# Patient Record
Sex: FEMALE | Race: WHITE | URBAN - METROPOLITAN AREA
[De-identification: names, ages, dates, MRNs, and addresses within clinical notes are randomized per-mention and may not be internally consistent; named-entity substitution may affect disease eponyms.]

---

## 2020-11-19 ENCOUNTER — OFFICE VISIT (OUTPATIENT)
Dept: URBAN - METROPOLITAN AREA CLINIC 27 | Facility: CLINIC | Age: 80
End: 2020-11-19
Payer: MEDICARE

## 2020-11-19 PROCEDURE — 92012 INTRM OPH EXAM EST PATIENT: CPT | Performed by: OPHTHALMOLOGY

## 2020-11-19 PROCEDURE — 92134 CPTRZ OPH DX IMG PST SGM RTA: CPT | Performed by: OPHTHALMOLOGY

## 2020-11-19 PROCEDURE — 67028 INJECTION EYE DRUG: CPT | Performed by: OPHTHALMOLOGY

## 2020-11-19 ASSESSMENT — INTRAOCULAR PRESSURE
OD: 15
OS: 12

## 2020-11-19 NOTE — IMPRESSION/PLAN
Impression: Central retinal vein occls, right eye, with macular edema: H34.8110 OD.
s/p Ozurdex OD 8/11/2020 Resolved S/P PPV, MP, EL, IVK 5/12/14
s/p IVK 11/12/14
s/p Avastin, last 10/15/14 Plan: Exam and OCT testing show recurrent CME OD s/p Ozurdex 8/11/2020. The IOP is stable, and there is no evidence of NV. The risks versus benefits of periocular/intraocular steroids, anti-VEGF treatment, and observation were reviewed with the patient in detail. Recommend treatment OD at this time. Pt elects to proceed with Ozurdex, which was performed today without complications. Patient understands to call our office with any worsening vision. Extend treatment interval to 4 months. RTC 8 weeks, OCT OU, exam, IOP check RTC 16 weeks for OCT OU, re-eval for Ozurdex

## 2021-01-14 ENCOUNTER — OFFICE VISIT (OUTPATIENT)
Dept: URBAN - METROPOLITAN AREA CLINIC 27 | Facility: CLINIC | Age: 81
End: 2021-01-14
Payer: MEDICARE

## 2021-01-14 PROCEDURE — 99213 OFFICE O/P EST LOW 20 MIN: CPT | Performed by: OPHTHALMOLOGY

## 2021-01-14 PROCEDURE — 92134 CPTRZ OPH DX IMG PST SGM RTA: CPT | Performed by: OPHTHALMOLOGY

## 2021-01-14 ASSESSMENT — INTRAOCULAR PRESSURE
OD: 16
OS: 10

## 2021-01-14 NOTE — IMPRESSION/PLAN
Impression: Central retinal vein occls, right eye, with macular edema: H34.8110 OD.
s/p Ozurdex OD 8/11/2020 Resolved S/P PPV, MP, EL, IVK 5/12/14
s/p IVK 11/12/14
s/p Avastin, last 10/15/14 Plan: Exam and OCT testing show improving CME OD s/p Ozurdex 11/19/2020. The IOP is stable, and there is no evidence of NV. The risks versus benefits of periocular/intraocular steroids, anti-VEGF treatment, and observation were reviewed with the patient in detail. Recommend observation OD at this time. Patient understands to call our office with any worsening vision. Extend treatment interval to 4 months. 

RTC 8 weeks, OCT OU, exam, re-eval for Ozurdex

## 2021-03-17 ENCOUNTER — OFFICE VISIT (OUTPATIENT)
Dept: URBAN - METROPOLITAN AREA CLINIC 41 | Facility: CLINIC | Age: 81
End: 2021-03-17
Payer: MEDICARE

## 2021-03-17 DIAGNOSIS — Z96.1 PRESENCE OF INTRAOCULAR LENS: ICD-10-CM

## 2021-03-17 PROCEDURE — 92134 CPTRZ OPH DX IMG PST SGM RTA: CPT | Performed by: OPHTHALMOLOGY

## 2021-03-17 PROCEDURE — 99213 OFFICE O/P EST LOW 20 MIN: CPT | Performed by: OPHTHALMOLOGY

## 2021-03-17 PROCEDURE — 67028 INJECTION EYE DRUG: CPT | Performed by: OPHTHALMOLOGY

## 2021-03-17 ASSESSMENT — INTRAOCULAR PRESSURE
OS: 10
OD: 16

## 2021-03-17 NOTE — IMPRESSION/PLAN
Impression: Central retinal vein occls, right eye, with macular edema: H34.8110 OD.
s/p Ozurdex OD 8/11/2020 Resolved S/P PPV, MP, EL, IVK 5/12/14
s/p IVK 11/12/14
s/p Avastin, last 10/15/14 Plan: Exam and OCT testing show recurrent, juxtafoveal CME OD s/p Ozurdex 11/19/2020. The IOP is stable, and there is no evidence of NV. The risks versus benefits of periocular/intraocular steroids, anti-VEGF treatment, and observation were reviewed with the patient in detail. Recommend Ozurdex OD at this time. RBA discussed. Ozurdex OD was performed in the office per protocol without complication. Patient understands to call our office with any worsening vision. 

RTC 8 weeks, OCT OU, exam, re-eval for Ozurdex

## 2021-05-06 ENCOUNTER — OFFICE VISIT (OUTPATIENT)
Dept: URBAN - METROPOLITAN AREA CLINIC 27 | Facility: CLINIC | Age: 81
End: 2021-05-06
Payer: MEDICARE

## 2021-05-06 PROCEDURE — 99213 OFFICE O/P EST LOW 20 MIN: CPT | Performed by: OPHTHALMOLOGY

## 2021-05-06 PROCEDURE — 92134 CPTRZ OPH DX IMG PST SGM RTA: CPT | Performed by: OPHTHALMOLOGY

## 2021-05-06 ASSESSMENT — INTRAOCULAR PRESSURE
OD: 17
OS: 12

## 2021-05-06 NOTE — IMPRESSION/PLAN
Impression: Nonexudative age-related macular degeneration, bilateral, intermediate dry stage: H35.3132. Bilateral. Plan: Stable dry PED OU on exam/OCT. No SRH OU. Cont AREDS-2 and AG monitoring.

## 2021-05-06 NOTE — IMPRESSION/PLAN
Impression: Central retinal vein occls, right eye, with macular edema: H34.8110 OD.
s/p Ozurdex OD 3/17/21 Resolved S/P PPV, MP, EL, IVK 5/12/14
s/p IVK 11/12/14
s/p Avastin, last 10/15/14 Plan: Exam and OCT testing show improving juxtafoveal CME OD s/p Ozurdex 3/17/21. The IOP is stable, and there is no evidence of NV. The risks versus benefits of periocular/intraocular steroids, anti-VEGF treatment, and observation were reviewed with the patient in detail. Recommend observation. Patient understands to call our office with any worsening vision. 

RTC 8 weeks, OCT OU, exam, re-eval for Ozurdex

## 2021-07-15 ENCOUNTER — OFFICE VISIT (OUTPATIENT)
Dept: URBAN - METROPOLITAN AREA CLINIC 27 | Facility: CLINIC | Age: 81
End: 2021-07-15
Payer: MEDICARE

## 2021-07-15 DIAGNOSIS — H35.3132 NONEXUDATIVE AGE-RELATED MACULAR DEGENERATION, BILATERAL, INTERMEDIATE DRY STAGE: ICD-10-CM

## 2021-07-15 DIAGNOSIS — H43.813 VITREOUS DEGENERATION, BILATERAL: ICD-10-CM

## 2021-07-15 DIAGNOSIS — H34.8110 CENTRAL RETINAL VEIN OCCLS, RIGHT EYE, WITH MACULAR EDEMA: Primary | ICD-10-CM

## 2021-07-15 PROCEDURE — 99213 OFFICE O/P EST LOW 20 MIN: CPT | Performed by: OPHTHALMOLOGY

## 2021-07-15 PROCEDURE — 92134 CPTRZ OPH DX IMG PST SGM RTA: CPT | Performed by: OPHTHALMOLOGY

## 2021-07-15 ASSESSMENT — INTRAOCULAR PRESSURE
OD: 12
OS: 8

## 2021-10-14 ENCOUNTER — OFFICE VISIT (OUTPATIENT)
Dept: URBAN - METROPOLITAN AREA CLINIC 27 | Facility: CLINIC | Age: 81
End: 2021-10-14
Payer: MEDICARE

## 2021-10-14 PROCEDURE — 92134 CPTRZ OPH DX IMG PST SGM RTA: CPT | Performed by: OPHTHALMOLOGY

## 2021-10-14 PROCEDURE — 67028 INJECTION EYE DRUG: CPT | Performed by: OPHTHALMOLOGY

## 2021-10-14 PROCEDURE — 99213 OFFICE O/P EST LOW 20 MIN: CPT | Performed by: OPHTHALMOLOGY

## 2021-10-14 ASSESSMENT — INTRAOCULAR PRESSURE
OS: 8
OD: 12

## 2021-10-14 NOTE — IMPRESSION/PLAN
Impression: Central retinal vein occls, right eye, with macular edema: H34.8110 OD.
s/p Ozurdex OD 3/17/21 Resolved S/P PPV, MP, EL, IVK 5/12/14
s/p IVK 11/12/14
s/p Avastin, last 10/15/14 Plan: Exam and OCT testing show worsening juxtafoveal CME OD s/p Ozurdex 3/17/21. The IOP is stable, and there is no evidence of NV. The risks versus benefits of periocular/intraocular steroids, anti-VEGF treatment, and observation were reviewed with the patient in detail. Recommend Ozurdex today, pt elects to proceed. Patient understands to call our office with any worsening vision. 

RTC 3 months, OCT OU, exam, re-eval for Ozurdex

## 2022-01-13 ENCOUNTER — OFFICE VISIT (OUTPATIENT)
Dept: URBAN - METROPOLITAN AREA CLINIC 27 | Facility: CLINIC | Age: 82
End: 2022-01-13
Payer: MEDICARE

## 2022-01-13 PROCEDURE — 99213 OFFICE O/P EST LOW 20 MIN: CPT | Performed by: OPHTHALMOLOGY

## 2022-01-13 PROCEDURE — 92134 CPTRZ OPH DX IMG PST SGM RTA: CPT | Performed by: OPHTHALMOLOGY

## 2022-01-13 ASSESSMENT — INTRAOCULAR PRESSURE
OS: 13
OD: 18

## 2022-01-13 NOTE — IMPRESSION/PLAN
Impression: Central retinal vein occls, right eye, with macular edema: H34.8110 OD.
s/p Ozurdex OD 10/14/2021 Resolved S/P PPV, MP, EL, IVK 5/12/14
s/p IVK 11/12/14
s/p Avastin, last 10/15/14 Plan: Exam and OCT testing show stable, mildCME OD s/p Ozurdex 10/14/21. The IOP is borderline, but there is no evidence of NV. The risks versus benefits of periocular/intraocular steroids, anti-VEGF treatment, and observation were reviewed with the patient in detail. Recommend observation today. Patient understands to call our office with any worsening vision. 

RTC 6 weeks, OCT OU, exam, re-eval for Ozurdex

## 2022-01-13 NOTE — IMPRESSION/PLAN
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 Plan: Being worked up for OCP. Followed by Dr. Claude Taylor.   Scheduled for second opinion with Dr. Oren Kelly

## 2022-03-22 ENCOUNTER — OFFICE VISIT (OUTPATIENT)
Dept: URBAN - METROPOLITAN AREA CLINIC 27 | Facility: CLINIC | Age: 82
End: 2022-03-22
Payer: MEDICARE

## 2022-03-22 DIAGNOSIS — L12.8 OTHER PEMPHIGOID: ICD-10-CM

## 2022-03-22 PROCEDURE — 67028 INJECTION EYE DRUG: CPT | Performed by: OPHTHALMOLOGY

## 2022-03-22 PROCEDURE — 99213 OFFICE O/P EST LOW 20 MIN: CPT | Performed by: OPHTHALMOLOGY

## 2022-03-22 PROCEDURE — 92134 CPTRZ OPH DX IMG PST SGM RTA: CPT | Performed by: OPHTHALMOLOGY

## 2022-03-22 ASSESSMENT — INTRAOCULAR PRESSURE
OS: 8
OD: 10

## 2022-03-22 NOTE — IMPRESSION/PLAN
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 Plan: Being worked up for OCP.   S/P biopsy with Dr. Sharon Cleveland

## 2022-03-22 NOTE — IMPRESSION/PLAN
Impression: Central retinal vein occls, right eye, with macular edema: H34.8110 OD.
s/p Ozurdex OD 10/14/21 Resolved S/P PPV, MP, EL, IVK 5/12/14
s/p IVK 11/12/14
s/p Avastin, last 10/15/14 Plan: Exam and OCT testing show worsening juxtafoveal CME OD s/p Ozurdex 10/14/21. The IOP is stable, and there is no evidence of NV. The risks versus benefits of periocular/intraocular steroids, anti-VEGF treatment, and observation were reviewed with the patient in detail. Recommend Ozurdex today, pt elects to proceed. Patient understands to call our office with any worsening vision. 

RTC 3 months, OCT OU, exam, re-eval for Ozurdex

## 2022-06-28 ENCOUNTER — OFFICE VISIT (OUTPATIENT)
Dept: URBAN - METROPOLITAN AREA CLINIC 27 | Facility: CLINIC | Age: 82
End: 2022-06-28
Payer: MEDICARE

## 2022-06-28 DIAGNOSIS — H35.3132 NONEXUDATIVE AGE-RELATED MACULAR DEGENERATION, BILATERAL, INTERMEDIATE DRY STAGE: ICD-10-CM

## 2022-06-28 DIAGNOSIS — H43.813 VITREOUS DEGENERATION, BILATERAL: ICD-10-CM

## 2022-06-28 DIAGNOSIS — H34.8110 CENTRAL RETINAL VEIN OCCLUSION, RIGHT EYE, WITH MACULAR EDEMA: Primary | ICD-10-CM

## 2022-06-28 DIAGNOSIS — L12.8 OTHER PEMPHIGOID: ICD-10-CM

## 2022-06-28 PROCEDURE — 92134 CPTRZ OPH DX IMG PST SGM RTA: CPT | Performed by: OPHTHALMOLOGY

## 2022-06-28 PROCEDURE — 99213 OFFICE O/P EST LOW 20 MIN: CPT | Performed by: OPHTHALMOLOGY

## 2022-06-28 PROCEDURE — 67028 INJECTION EYE DRUG: CPT | Performed by: OPHTHALMOLOGY

## 2022-06-28 ASSESSMENT — INTRAOCULAR PRESSURE
OS: 11
OD: 14

## 2022-06-28 NOTE — IMPRESSION/PLAN
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 Plan: Being worked up for OCP.   S/P biopsy with Dr. Cody Frey

## 2022-06-28 NOTE — IMPRESSION/PLAN
Impression: Central retinal vein occls, right eye, with macular edema: H34.8110 OD.
s/p Ozurdex OD 03//22/22 Resolved S/P PPV, MP, EL, IVK 5/12/14
s/p IVK 11/12/14
s/p Avastin, last 10/15/14 Plan: Exam and OCT continue to demonstrate stable juxtafoveal CME OD s/p Ozurdex 03/22/22. The IOP is stable, and there is no evidence of NV. The risks versus benefits of periocular/intraocular steroids, anti-VEGF treatment, and observation were reviewed with the patient in detail. Recommend Ozurdex today, pt elects to proceed. Patient understands to call our office with any worsening vision. 

RTC 3 months, OCT OU, exam, re-eval for Ozurdex

## 2022-10-06 ENCOUNTER — OFFICE VISIT (OUTPATIENT)
Dept: URBAN - METROPOLITAN AREA CLINIC 27 | Facility: CLINIC | Age: 82
End: 2022-10-06
Payer: MEDICARE

## 2022-10-06 DIAGNOSIS — H35.3132 NONEXUDATIVE AGE-RELATED MACULAR DEGENERATION, BILATERAL, INTERMEDIATE DRY STAGE: ICD-10-CM

## 2022-10-06 DIAGNOSIS — H43.813 VITREOUS DEGENERATION, BILATERAL: ICD-10-CM

## 2022-10-06 DIAGNOSIS — L12.8 OTHER PEMPHIGOID: ICD-10-CM

## 2022-10-06 DIAGNOSIS — H34.8110 CENTRAL RETINAL VEIN OCCLUSION, RIGHT EYE, WITH MACULAR EDEMA: Primary | ICD-10-CM

## 2022-10-06 PROCEDURE — 99213 OFFICE O/P EST LOW 20 MIN: CPT | Performed by: OPHTHALMOLOGY

## 2022-10-06 PROCEDURE — 92134 CPTRZ OPH DX IMG PST SGM RTA: CPT | Performed by: OPHTHALMOLOGY

## 2022-10-06 ASSESSMENT — INTRAOCULAR PRESSURE
OD: 17
OS: 15

## 2022-10-06 NOTE — IMPRESSION/PLAN
Impression: Other pemphigoid: L12.8.  Bilateral. Plan: S/P biopsy with Dr. Randa Moura, negative for OCP

## 2022-10-06 NOTE — IMPRESSION/PLAN
Impression: Central retinal vein occls, right eye, with macular edema: H34.8110 OD.
s/p Ozurdex OD 06/28/22 Resolved S/P PPV, MP, EL, IVK 5/12/14
s/p IVK 11/12/14
s/p Avastin, last 10/15/14 Plan: Exam and OCT revealstable juxtafoveal CME OD s/p Ozurdex 06/28/22. The IOP is stable, and there is no evidence of NV. The risks versus benefits of periocular/intraocular steroids, anti-VEGF treatment, and observation were reviewed with the patient in detail. Recommend observation today given stability. Patient understands to call our office with any worsening vision. 

RTC 3 months, OCT OU, exam, re-eval for Ozurdex

## 2023-01-12 ENCOUNTER — OFFICE VISIT (OUTPATIENT)
Dept: URBAN - METROPOLITAN AREA CLINIC 27 | Facility: CLINIC | Age: 83
End: 2023-01-12
Payer: MEDICARE

## 2023-01-12 DIAGNOSIS — H34.8110 CENTRAL RETINAL VEIN OCCLUSION, RIGHT EYE, WITH MACULAR EDEMA: Primary | ICD-10-CM

## 2023-01-12 DIAGNOSIS — H43.813 VITREOUS DEGENERATION, BILATERAL: ICD-10-CM

## 2023-01-12 DIAGNOSIS — H35.3132 NONEXUDATIVE AGE-RELATED MACULAR DEGENERATION, BILATERAL, INTERMEDIATE DRY STAGE: ICD-10-CM

## 2023-01-12 DIAGNOSIS — L12.8 OTHER PEMPHIGOID: ICD-10-CM

## 2023-01-12 PROCEDURE — 67028 INJECTION EYE DRUG: CPT | Performed by: OPHTHALMOLOGY

## 2023-01-12 PROCEDURE — 92134 CPTRZ OPH DX IMG PST SGM RTA: CPT | Performed by: OPHTHALMOLOGY

## 2023-01-12 PROCEDURE — 99214 OFFICE O/P EST MOD 30 MIN: CPT | Performed by: OPHTHALMOLOGY

## 2023-01-12 ASSESSMENT — INTRAOCULAR PRESSURE
OD: 17
OS: 13

## 2023-01-12 NOTE — IMPRESSION/PLAN
Impression: Central retinal vein occls, right eye, with macular edema: H34.8110 OD.
s/p Ozurdex OD 06/28/22 Resolved S/P PPV, MP, EL, IVK 5/12/14
s/p IVK 11/12/14
s/p Avastin, last 10/15/14 Plan: Exam and OCT demonstrate worsening CME OD s/p Ozurdex 06/28/22. The IOP is stable, and there is no evidence of NV. The risks versus benefits of periocular/intraocular steroids, anti-VEGF treatment, and observation were reviewed with the patient in detail. Recommend Ozurdex OD today. RBA discussed. Pt elects to proceed. Patient understands to call our office with any worsening vision. 

RTC 3 months, OCT OU, exam, re-eval for Ozurdex

## 2023-01-12 NOTE — IMPRESSION/PLAN
Impression: Other pemphigoid: L12.8.  Bilateral. Plan: S/P biopsy with Dr. Ramos Phan, negative for OCP

## 2023-04-06 ENCOUNTER — OFFICE VISIT (OUTPATIENT)
Dept: URBAN - METROPOLITAN AREA CLINIC 27 | Facility: CLINIC | Age: 83
End: 2023-04-06
Payer: MEDICARE

## 2023-04-06 DIAGNOSIS — L12.8 OTHER PEMPHIGOID: ICD-10-CM

## 2023-04-06 DIAGNOSIS — H43.813 VITREOUS DEGENERATION, BILATERAL: ICD-10-CM

## 2023-04-06 DIAGNOSIS — H34.8110 CENTRAL RETINAL VEIN OCCLUSION, RIGHT EYE, WITH MACULAR EDEMA: Primary | ICD-10-CM

## 2023-04-06 DIAGNOSIS — H35.3132 NONEXUDATIVE AGE-RELATED MACULAR DEGENERATION, BILATERAL, INTERMEDIATE DRY STAGE: ICD-10-CM

## 2023-04-06 PROCEDURE — 99213 OFFICE O/P EST LOW 20 MIN: CPT | Performed by: OPHTHALMOLOGY

## 2023-04-06 PROCEDURE — 92134 CPTRZ OPH DX IMG PST SGM RTA: CPT | Performed by: OPHTHALMOLOGY

## 2023-04-06 ASSESSMENT — INTRAOCULAR PRESSURE
OS: 11
OD: 14

## 2023-04-06 NOTE — IMPRESSION/PLAN
Impression: Other pemphigoid: L12.8.  Bilateral. Plan: S/P biopsy with Dr. Pamela Lopez, negative for OCP

## 2023-04-06 NOTE — IMPRESSION/PLAN
Impression: Central retinal vein occls, right eye, with macular edema: H34.8110 OD.
s/p Ozurdex OD 01/12/23 Resolved S/P PPV, MP, EL, IVK 5/12/14
s/p IVK 11/12/14
s/p Avastin, last 10/15/14 Plan: Exam and OCT show improved CME OD s/p Ozurdex 01/12/23. The IOP is stable, and there is no evidence of NV. The risks versus benefits of periocular/intraocular steroids, anti-VEGF treatment, and observation were reviewed with the patient in detail. Recommend observation. Patient understands to call our office with any worsening vision. 

RTC 3 months, OCT OU, exam, re-eval for Ozurdex

## 2023-07-25 ENCOUNTER — OFFICE VISIT (OUTPATIENT)
Dept: URBAN - METROPOLITAN AREA CLINIC 27 | Facility: CLINIC | Age: 83
End: 2023-07-25
Payer: MEDICARE

## 2023-07-25 DIAGNOSIS — L12.8 OTHER PEMPHIGOID: ICD-10-CM

## 2023-07-25 DIAGNOSIS — H34.8110 CENTRAL RETINAL VEIN OCCLUSION, RIGHT EYE, WITH MACULAR EDEMA: Primary | ICD-10-CM

## 2023-07-25 DIAGNOSIS — H43.813 VITREOUS DEGENERATION, BILATERAL: ICD-10-CM

## 2023-07-25 DIAGNOSIS — H35.3132 NONEXUDATIVE AGE-RELATED MACULAR DEGENERATION, BILATERAL, INTERMEDIATE DRY STAGE: ICD-10-CM

## 2023-07-25 PROCEDURE — 92134 CPTRZ OPH DX IMG PST SGM RTA: CPT | Performed by: OPHTHALMOLOGY

## 2023-07-25 PROCEDURE — 99213 OFFICE O/P EST LOW 20 MIN: CPT | Performed by: OPHTHALMOLOGY

## 2023-07-25 ASSESSMENT — INTRAOCULAR PRESSURE
OS: 12
OD: 14

## 2023-12-05 ENCOUNTER — OFFICE VISIT (OUTPATIENT)
Dept: URBAN - METROPOLITAN AREA CLINIC 41 | Facility: CLINIC | Age: 83
End: 2023-12-05
Payer: MEDICARE

## 2023-12-05 DIAGNOSIS — H34.8110 CENTRAL RETINAL VEIN OCCLUSION, RIGHT EYE, WITH MACULAR EDEMA: Primary | ICD-10-CM

## 2023-12-05 DIAGNOSIS — H43.813 VITREOUS DEGENERATION, BILATERAL: ICD-10-CM

## 2023-12-05 DIAGNOSIS — L12.8 OTHER PEMPHIGOID: ICD-10-CM

## 2023-12-05 DIAGNOSIS — H35.3132 NONEXUDATIVE AGE-RELATED MACULAR DEGENERATION, BILATERAL, INTERMEDIATE DRY STAGE: ICD-10-CM

## 2023-12-05 PROCEDURE — 92014 COMPRE OPH EXAM EST PT 1/>: CPT | Performed by: OPHTHALMOLOGY

## 2023-12-05 PROCEDURE — 92134 CPTRZ OPH DX IMG PST SGM RTA: CPT | Performed by: OPHTHALMOLOGY

## 2023-12-05 ASSESSMENT — INTRAOCULAR PRESSURE
OS: 12
OD: 12

## 2024-06-04 ENCOUNTER — OFFICE VISIT (OUTPATIENT)
Dept: URBAN - METROPOLITAN AREA CLINIC 41 | Facility: CLINIC | Age: 84
End: 2024-06-04
Payer: MEDICARE

## 2024-06-04 DIAGNOSIS — H35.3132 NONEXUDATIVE AGE-RELATED MACULAR DEGENERATION, BILATERAL, INTERMEDIATE DRY STAGE: ICD-10-CM

## 2024-06-04 DIAGNOSIS — Z96.1 PRESENCE OF INTRAOCULAR LENS: ICD-10-CM

## 2024-06-04 DIAGNOSIS — H43.813 VITREOUS DEGENERATION, BILATERAL: ICD-10-CM

## 2024-06-04 DIAGNOSIS — H34.8110 CENTRAL RETINAL VEIN OCCLUSION, RIGHT EYE, WITH MACULAR EDEMA: Primary | ICD-10-CM

## 2024-06-04 DIAGNOSIS — L12.8 OTHER PEMPHIGOID: ICD-10-CM

## 2024-06-04 PROCEDURE — 92134 CPTRZ OPH DX IMG PST SGM RTA: CPT | Performed by: OPHTHALMOLOGY

## 2024-06-04 PROCEDURE — 92014 COMPRE OPH EXAM EST PT 1/>: CPT | Performed by: OPHTHALMOLOGY

## 2024-06-04 ASSESSMENT — INTRAOCULAR PRESSURE
OS: 8
OD: 12

## 2024-12-03 ENCOUNTER — OFFICE VISIT (OUTPATIENT)
Dept: URBAN - METROPOLITAN AREA CLINIC 41 | Facility: CLINIC | Age: 84
End: 2024-12-03
Payer: MEDICARE

## 2024-12-03 DIAGNOSIS — Z96.1 PRESENCE OF INTRAOCULAR LENS: ICD-10-CM

## 2024-12-03 DIAGNOSIS — H43.813 VITREOUS DEGENERATION, BILATERAL: ICD-10-CM

## 2024-12-03 DIAGNOSIS — H34.8110 CENTRAL RETINAL VEIN OCCLUSION, RIGHT EYE, WITH MACULAR EDEMA: Primary | ICD-10-CM

## 2024-12-03 DIAGNOSIS — L12.8 OTHER PEMPHIGOID: ICD-10-CM

## 2024-12-03 DIAGNOSIS — H35.3132 NONEXUDATIVE AGE-RELATED MACULAR DEGENERATION, BILATERAL, INTERMEDIATE DRY STAGE: ICD-10-CM

## 2024-12-03 PROCEDURE — 92014 COMPRE OPH EXAM EST PT 1/>: CPT | Performed by: OPHTHALMOLOGY

## 2024-12-03 PROCEDURE — 92134 CPTRZ OPH DX IMG PST SGM RTA: CPT | Performed by: OPHTHALMOLOGY

## 2024-12-03 RX ORDER — LOSARTAN POTASSIUM 50 MG/1
50 MG TABLET, FILM COATED ORAL
Qty: 0 | Refills: 0 | Status: ACTIVE
Start: 2024-12-03

## 2024-12-03 ASSESSMENT — INTRAOCULAR PRESSURE
OS: 10
OD: 13

## 2024-12-22 NOTE — IMPRESSION/PLAN
Impression: Central retinal vein occls, right eye, with macular edema: H34.8110 OD.
s/p Ozurdex OD 3/17/21 Resolved S/P PPV, MP, EL, IVK 5/12/14
s/p IVK 11/12/14
s/p Avastin, last 10/15/14 Plan: Exam and OCT testing show stable juxtafoveal CME OD s/p Ozurdex 3/17/21. The IOP is stable, and there is no evidence of NV. The risks versus benefits of periocular/intraocular steroids, anti-VEGF treatment, and observation were reviewed with the patient in detail. Recommend continued observation. Patient understands to call our office with any worsening vision. 

RTC 3 months, OCT OU, exam, re-eval for Ozurdex Awake/Alert

## 2025-06-03 ENCOUNTER — OFFICE VISIT (OUTPATIENT)
Dept: URBAN - METROPOLITAN AREA CLINIC 41 | Facility: CLINIC | Age: 85
End: 2025-06-03
Payer: MEDICARE

## 2025-06-03 DIAGNOSIS — H34.8110 CENTRAL RETINAL VEIN OCCLUSION, RIGHT EYE, WITH MACULAR EDEMA: Primary | ICD-10-CM

## 2025-06-03 DIAGNOSIS — Z96.1 PRESENCE OF INTRAOCULAR LENS: ICD-10-CM

## 2025-06-03 DIAGNOSIS — H35.3132 NONEXUDATIVE AGE-RELATED MACULAR DEGENERATION, BILATERAL, INTERMEDIATE DRY STAGE: ICD-10-CM

## 2025-06-03 DIAGNOSIS — H43.813 VITREOUS DEGENERATION, BILATERAL: ICD-10-CM

## 2025-06-03 DIAGNOSIS — L12.8 OTHER PEMPHIGOID: ICD-10-CM

## 2025-06-03 PROCEDURE — 92134 CPTRZ OPH DX IMG PST SGM RTA: CPT | Performed by: OPHTHALMOLOGY

## 2025-06-03 PROCEDURE — 92014 COMPRE OPH EXAM EST PT 1/>: CPT | Performed by: OPHTHALMOLOGY

## 2025-06-03 ASSESSMENT — INTRAOCULAR PRESSURE
OS: 8
OD: 12